# Patient Record
Sex: FEMALE | Race: WHITE | NOT HISPANIC OR LATINO
[De-identification: names, ages, dates, MRNs, and addresses within clinical notes are randomized per-mention and may not be internally consistent; named-entity substitution may affect disease eponyms.]

---

## 2021-04-23 ENCOUNTER — APPOINTMENT (OUTPATIENT)
Dept: OTOLARYNGOLOGY | Facility: CLINIC | Age: 51
End: 2021-04-23
Payer: MEDICAID

## 2021-04-23 VITALS — BODY MASS INDEX: 20.4 KG/M2 | WEIGHT: 130 LBS | HEIGHT: 67 IN | TEMPERATURE: 98.3 F

## 2021-04-23 DIAGNOSIS — Z86.59 PERSONAL HISTORY OF OTHER MENTAL AND BEHAVIORAL DISORDERS: ICD-10-CM

## 2021-04-23 DIAGNOSIS — Z86.79 PERSONAL HISTORY OF OTHER DISEASES OF THE CIRCULATORY SYSTEM: ICD-10-CM

## 2021-04-23 DIAGNOSIS — Z82.49 FAMILY HISTORY OF ISCHEMIC HEART DISEASE AND OTHER DISEASES OF THE CIRCULATORY SYSTEM: ICD-10-CM

## 2021-04-23 DIAGNOSIS — Z78.9 OTHER SPECIFIED HEALTH STATUS: ICD-10-CM

## 2021-04-23 DIAGNOSIS — R42 DIZZINESS AND GIDDINESS: ICD-10-CM

## 2021-04-23 DIAGNOSIS — Z86.69 PERSONAL HISTORY OF OTHER DISEASES OF THE NERVOUS SYSTEM AND SENSE ORGANS: ICD-10-CM

## 2021-04-23 DIAGNOSIS — Z86.39 PERSONAL HISTORY OF OTHER ENDOCRINE, NUTRITIONAL AND METABOLIC DISEASE: ICD-10-CM

## 2021-04-23 PROCEDURE — G0268 REMOVAL OF IMPACTED WAX MD: CPT

## 2021-04-23 PROCEDURE — 92567 TYMPANOMETRY: CPT

## 2021-04-23 PROCEDURE — 99072 ADDL SUPL MATRL&STAF TM PHE: CPT

## 2021-04-23 PROCEDURE — 99204 OFFICE O/P NEW MOD 45 MIN: CPT | Mod: 25

## 2021-04-23 PROCEDURE — 92556 SPEECH AUDIOMETRY COMPLETE: CPT

## 2021-04-23 PROCEDURE — 92552 PURE TONE AUDIOMETRY AIR: CPT

## 2021-04-23 RX ORDER — MECLIZINE HYDROCHLORIDE 12.5 MG/1
12.5 TABLET ORAL
Refills: 0 | Status: ACTIVE | COMMUNITY

## 2021-04-23 RX ORDER — LIOTHYRONINE SODIUM 50 UG/1
TABLET ORAL
Refills: 0 | Status: ACTIVE | COMMUNITY

## 2021-04-23 RX ORDER — LEVOTHYROXINE SODIUM 137 UG/1
TABLET ORAL
Refills: 0 | Status: ACTIVE | COMMUNITY

## 2021-04-23 RX ORDER — CLONAZEPAM 2 MG/1
TABLET ORAL
Refills: 0 | Status: ACTIVE | COMMUNITY

## 2021-04-23 RX ORDER — ONDANSETRON 4 MG/1
4 TABLET ORAL
Refills: 0 | Status: ACTIVE | COMMUNITY

## 2021-04-23 NOTE — HISTORY OF PRESENT ILLNESS
[de-identified] : Initial visit. Her chief complaint is "extreme dizziness, nausea, headache".\par \par She reports that approximately 7-10 days ago she developed spontaneous vertigo.\par She was evaluated in urgent care no specific diagnosis was made. She was given Antivert which is not helped her.\par She then was evaluated by her primary care doctor who did not appreciate any specific pathology.\par \par She reports a history of losing the hearing in her right ear in her 20s. She reports an extensive workup and eventually the etiology was thought to be viral.\par \par Her vertigo seems to be positional.\par She does not note any change in her hearing.

## 2021-04-23 NOTE — ASSESSMENT
[FreeTextEntry1] : My differential diagnosis includes but is not limited to a vestibular neuronitis or a benign positional paroxysmal vertigo.\par She will be treated with a Medrol dose pack.\par She has normal hearing on testing in her left ear so I am not suspicious for a labyrinthitis.\par She is near complete hearing loss in her right ear but I'm not been recommended further workup as this was worked up in the past.\par \par If she is not improving over the weekend she will require vestibular rehabilitation potential he repositioning maneuvers. I will facilitate this for her.

## 2021-07-27 RX ORDER — METHYLPREDNISOLONE 4 MG/1
4 TABLET ORAL
Qty: 1 | Refills: 0 | Status: ACTIVE | COMMUNITY
Start: 2021-04-23 | End: 1900-01-01

## 2021-08-03 ENCOUNTER — APPOINTMENT (OUTPATIENT)
Dept: OTOLARYNGOLOGY | Facility: CLINIC | Age: 51
End: 2021-08-03
Payer: MEDICAID

## 2021-08-03 VITALS — HEIGHT: 67 IN | TEMPERATURE: 97.9 F | BODY MASS INDEX: 20.4 KG/M2 | WEIGHT: 130 LBS

## 2021-08-03 DIAGNOSIS — R42 DIZZINESS AND GIDDINESS: ICD-10-CM

## 2021-08-03 DIAGNOSIS — H90.5 UNSPECIFIED SENSORINEURAL HEARING LOSS: ICD-10-CM

## 2021-08-03 DIAGNOSIS — J30.0 VASOMOTOR RHINITIS: ICD-10-CM

## 2021-08-03 DIAGNOSIS — R51.9 HEADACHE, UNSPECIFIED: ICD-10-CM

## 2021-08-03 PROCEDURE — 99214 OFFICE O/P EST MOD 30 MIN: CPT | Mod: 25

## 2021-08-03 PROCEDURE — 31231 NASAL ENDOSCOPY DX: CPT

## 2021-08-03 NOTE — HISTORY OF PRESENT ILLNESS
[de-identified] : CHAYITO MUNSON has a history of hearing loss since she was 23 years old from sudden loss with vertigo; The vertigo resolved after approx 1 month.  Heraing loss did not resolve.\par \par Patient reports of vertigo and feels off balance after her 2nd COVID Vaccine in April 2021.   Treated with oral steroids which helped.\par \par 1 week ago, traveled to [place], with recurrent vertigo and fatigue, headache. Nasal congestion.  Gradual onset of dizziness.  Nausea. Drunk feeling with spinning. constant without provocation. Exhaustion.  Better with rest. Non episodes. Pre syncope.  No change in hearing or tinnitus.  \par Started with oral steroids x 2 by PCP with no perceived benefit. \par Reports a lot of recent stress\par \par PMH: Tardive Dyskinesia from anti depressant\par \par PMH: breast CA\par \par

## 2021-08-03 NOTE — PHYSICAL EXAM
[FreeTextEntry1] : Procedure: Microscopic Ear Exam\par \par Left ear:  Ear canal intact without inflammation or lesion.  \par Tympanic membrane intact without inflammation.\par \par Right ear:  Ear canal intact without inflammation or lesion.  \par Tympanic membrane intact without inflammation.\par \par  [Nasal Endoscopy Performed] : nasal endoscopy was performed, see procedure section for findings [Midline] : trachea located in midline position [Normal] : no rashes

## 2021-08-03 NOTE — CONSULT LETTER
[Please see my note below.] : Please see my note below. [FreeTextEntry2] : Dear JOSH RYAN  [FreeTextEntry1] : Thank you for allowing me to participate in the care of CHAYITO MUNSON .\par Please see the attached visit note.\par \par \par \par Palomo Wolfe\par Otology\par Medical Director of [facility]\par Department of Otolaryngology\par

## 2021-08-03 NOTE — ASSESSMENT
[FreeTextEntry1] : impression:\par \par Nonspecific dizziness, with no specific evidence of an otologic source. This remains a possibility. Symptoms are associated with a constellation of other symptoms. Given her history of tardive dyskinesia and headaches, I have recommended that she consult her neurologist. e discussed this in detail.\par \par The patient presents with multiple nonspecific symptoms. She is quite clearly emotionally upset and breakdown crying when discussing her current symptoms and the limitations. It gets to her life. I have discussed this with her in detail. She states that she is under a lot of stress and has a history of panic disorder. She's currently under the care of a mental health professional.\par \par \par Time based billing:  I have spent 30 minutes of time, excluding procedures on this encounter.

## 2021-08-24 ENCOUNTER — APPOINTMENT (OUTPATIENT)
Dept: BREAST CENTER | Facility: CLINIC | Age: 51
End: 2021-08-24
Payer: MEDICAID

## 2021-08-24 VITALS — WEIGHT: 30 LBS | HEART RATE: 80 BPM | HEIGHT: 67 IN | BODY MASS INDEX: 4.71 KG/M2 | OXYGEN SATURATION: 99 %

## 2021-08-24 PROCEDURE — 99204 OFFICE O/P NEW MOD 45 MIN: CPT

## 2021-08-25 NOTE — HISTORY OF PRESENT ILLNESS
[FreeTextEntry1] : Patient is a 51 y.o female presenting initially to establish care- she is s/p LEFT lumpectomy ([place] pres, Dr. Lowry) and SLNS (2009- age 39) for invasive ductal carcinoma ER 60% positive, IA 40% strong, Ki-67< 10%,  0/1 node negative for CA, s/p XR and 7 years tamoxifen. Her most recent mammo/US was 7 month ago (according to the pt was negative) She had BRCA testing in 2010 and was negative. She has no acute concerns. \par \par hx of IVF

## 2021-08-25 NOTE — PHYSICAL EXAM
[Supple] : supple [No Supraclavicular Adenopathy] : no supraclavicular adenopathy [No Cervical Adenopathy] : no cervical adenopathy [No dominant masses] : no dominant masses in right breast  [No dominant masses] : no dominant masses left breast [No Nipple Retraction] : no left nipple retraction [No Nipple Discharge] : no left nipple discharge [No Axillary Lymphadenopathy] : no left axillary lymphadenopathy [de-identified] : UOQ incision well healed- 3mm nodule medially- post surgical thickening as patient says it has been present for years.

## 2021-08-25 NOTE — ASSESSMENT
[FreeTextEntry1] : Patient is a 51 y.o female presenting initially to establish care- she is s/p LEFT lumpectomy and SLNS (2009- age 39) for invasive ductal carcinoma ER 60% positive, DC 40% strong, Ki-67< 10%, 0/1 node negative for CA, s/p XR and 7 years tamoxifen.SABRINA.

## 2021-08-25 NOTE — PAST MEDICAL HISTORY
[Menarche Age ____] : age at menarche was [unfilled] [Menopause Age____] : age at menopause was [unfilled] [Total Preg ___] : G[unfilled] [FreeTextEntry5] : NONE [FreeTextEntry6] : YES (IVF) [FreeTextEntry7] : NO [FreeTextEntry8] : N/A

## 2022-05-01 ENCOUNTER — EMERGENCY (EMERGENCY)
Facility: HOSPITAL | Age: 52
LOS: 1 days | Discharge: HOME | End: 2022-05-01
Attending: EMERGENCY MEDICINE | Admitting: EMERGENCY MEDICINE
Payer: MEDICAID

## 2022-05-01 VITALS
SYSTOLIC BLOOD PRESSURE: 121 MMHG | DIASTOLIC BLOOD PRESSURE: 83 MMHG | TEMPERATURE: 97 F | HEIGHT: 67 IN | HEART RATE: 77 BPM | WEIGHT: 130.07 LBS | RESPIRATION RATE: 18 BRPM | OXYGEN SATURATION: 99 %

## 2022-05-01 DIAGNOSIS — F41.9 ANXIETY DISORDER, UNSPECIFIED: ICD-10-CM
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
## 2022-05-01 DIAGNOSIS — Z88.0 ALLERGY STATUS TO PENICILLIN: ICD-10-CM

## 2022-05-01 DIAGNOSIS — F32.A DEPRESSION, UNSPECIFIED: ICD-10-CM

## 2022-05-01 DIAGNOSIS — M79.671 PAIN IN RIGHT FOOT: ICD-10-CM

## 2022-05-01 DIAGNOSIS — S92.354A NONDISPLACED FRACTURE OF FIFTH METATARSAL BONE, RIGHT FOOT, INITIAL ENCOUNTER FOR CLOSED FRACTURE: ICD-10-CM
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
## 2022-05-01 DIAGNOSIS — Z85.3 PERSONAL HISTORY OF MALIGNANT NEOPLASM OF BREAST: ICD-10-CM

## 2022-05-01 PROCEDURE — 73630 X-RAY EXAM OF FOOT: CPT

## 2022-05-01 PROCEDURE — 73630 X-RAY EXAM OF FOOT: CPT | Mod: 26,RT

## 2022-05-01 PROCEDURE — 99284 EMERGENCY DEPT VISIT MOD MDM: CPT | Mod: 25

## 2022-05-01 PROCEDURE — 73630 X-RAY EXAM OF FOOT: CPT | Mod: 26

## 2022-05-01 RX ORDER — OXYCODONE AND ACETAMINOPHEN 5; 325 MG/1; MG/1
1 TABLET ORAL ONCE
Refills: 0 | Status: DISCONTINUED | OUTPATIENT
Start: 2022-05-01 | End: 2022-05-01

## 2022-05-01 RX ADMIN — OXYCODONE AND ACETAMINOPHEN 1 TABLET(S): 5; 325 TABLET ORAL at 10:55

## 2022-05-01 NOTE — ED ADULT NURSE NOTE - OBJECTIVE STATEMENT
52y female presents to ED c/o right foot pain. Pt states she was wearing platform shoes yesterday and fell, twisting the right foot. Endorses pain to right lateral side of foot. Some redness noted. Pt ambulatory and partially weight bearing. No signs of deformity. Denies blood thinner use. A&Ox4.

## 2022-05-01 NOTE — ED PROVIDER NOTE - CLINICAL SUMMARY MEDICAL DECISION MAKING FREE TEXT BOX
Impression: s/p inversion injury of r foot w/ acute non-displaced comminuted fx of base of 5th metatarsal. NVI. Impression: s/p inversion injury of r foot w/ acute non-displaced comminuted fx of base of 5th metatarsal. NVI. Pt seen by podiatry and foot placed in ace wrap and cam boot. ED evaluation and management discussed with the patient in detail. RICE and close podiatry follow up encouraged.  Strict ED return instructions discussed in detail and patient given the opportunity to ask any questions about their discharge diagnosis and instructions. Patient verbalized understanding.

## 2022-05-01 NOTE — ED PROVIDER NOTE - PHYSICAL EXAMINATION
VITAL SIGNS: I have reviewed nursing notes and confirm.  CONSTITUTIONAL: Well appearing, in no acute distress.   SKIN:  warm and dry, no acute rash.   HEAD:  normocephalic, atraumatic.  EYES: EOM intact; conjunctiva and sclera clear.  ENT: No nasal discharge; airway clear.   NECK: Supple.  R foot: + ttp and swelling to base of 5th metatarsal; no tenderness elsewhere including knee, tib-fib, ankle jt, heel, toes. FROM though with pain. No clubbing, cyanosis or edema. No calf tenderness. Compartments soft. 2+ pulses to b/l ue/le.  NEURO: Alert, oriented, motor/ sensation intact.  PSYCH: Cooperative, mood and affect appropriate.

## 2022-05-01 NOTE — CONSULT NOTE ADULT - ASSESSMENT
A:         P:         Patient should follow up with Dr. Diego Reyes within 1 week of discharge.    Office information:          [place] Address- [address], [place] [address] Phone: [phone]         [place] Address- [address], [place] [address] Phone: [phone]   A: 52y old  Female who presents with a chief complaint of right foot pain S/P inversion injury yesterday. Podiatry consulted for comminuted, non-displaced, 5th metatarsal base fracture.     P:   -Pt evaluated and chart reviewed  -X-rays reviewed with pt-comminuted, non-displaced, 5th metatarsal base fracture  -Soares compression applied to pts right foot   -Pt instructed to Rest, Ice, Elevate right foot as much as possible  -Pt placed in CAM boot and given crutches for WBAT to RLE   -Pt can follow up outpt with Dr. Palafox     Patient should follow up with Dr. Diego Reyes within 1 week of discharge.    Office information:          [place] Address- [address] [address] Phone: [phone]         [place] Address- [address], [place] [address] Phone: [phone]

## 2022-05-01 NOTE — ED PROVIDER NOTE - PATIENT PORTAL LINK FT
You can access the FollowMyHealth Patient Portal offered by [facility] by registering at the following website: [email or web address]. By joining [facility]’s FollowMyHealth portal, you will also be able to view your health information using other applications (apps) compatible with our system.

## 2022-05-01 NOTE — ED PROVIDER NOTE - CARE PLAN
1 Principal Discharge DX:	Closed fracture of base of metatarsal bone, unspecified laterality, initial encounter

## 2022-05-01 NOTE — CONSULT NOTE ADULT - SUBJECTIVE AND OBJECTIVE BOX
***************INCOMPLETE******************      Attending: Dr. Reyes     Patient is a 52y old  Female who presents with a chief complaint of right foot pain S/P inversion injury     HPI:      Review of systems negative except per HPI and as stated below  General:	 no weakness; no fevers, no chills  Skin/Breast: no rash  Respiratory and Thorax: no SOB, no cough  Cardiovascular:	No chest pain  Gastrointestinal:	 no nausea, vomiting , diarrhea  Genitourinary:	no dysuria, no difficulty urinating, no hematuria  Musculoskeletal:	no weakness, no joint swelling/pain  Neurological:	no focal weakness/numbness  Endocrine:	no polyuria, no polydipsia    PAST MEDICAL & SURGICAL HISTORY:    Home Medications:    Allergies    penicillins (Hives)    Intolerances      FAMILY HISTORY:    Social History:       LABS              Vital Signs Last 24 Hrs  T(C): 36.3 (01 May 2022 10:19), Max: 36.3 (01 May 2022 10:19)  T(F): 97.4 (01 May 2022 10:19), Max: 97.4 (01 May 2022 10:19)  HR: 77 (01 May 2022 10:19) (77 - 77)  BP: 121/83 (01 May 2022 10:19) (121/83 - 121/83)  BP(mean): --  RR: 18 (01 May 2022 10:19) (18 - 18)  SpO2: 99% (01 May 2022 10:19) (99% - 99%)    PHYSICAL EXAM  General: NAD, AA0x3  Lower Extremity Focused:  Vasc:  Derm:  Neuro:  MSK:       RADIOLOGY: WET READ: comminuted, non-displaced 5th metatarsal base fracture                          Attending: Dr. Reyes     Patient is a 52y old  Female who presents with a chief complaint of right foot pain S/P inversion injury yesterday.    HPI:  53yo female PMHX breast ca, depression/ anxiety who presents with Right foot pain and swelling s/p inversion injury of foot yesterday while wearing platform heels. Pain stared immediately after injury. Pt has been ambulating w/ assistance since then and taking tylenol/advil for pain with mild improvement. She denies any other injuries during her fall.     Review of systems negative except per HPI and as stated below  General:	 no weakness; no fevers, no chills  Skin/Breast: no rash  Respiratory and Thorax: no SOB, no cough  Cardiovascular:	No chest pain  Gastrointestinal:	 no nausea, vomiting , diarrhea  Genitourinary:	no dysuria, no difficulty urinating, no hematuria  Musculoskeletal:	no weakness, no joint swelling/pain  Neurological:	no focal weakness/numbness  Endocrine:	no polyuria, no polydipsia    PAST MEDICAL & SURGICAL HISTORY:    Home Medications:    Allergies    penicillins (Hives)    Intolerances      FAMILY HISTORY:    Social History:     LABS      Vital Signs Last 24 Hrs  T(C): 36.3 (01 May 2022 10:19), Max: 36.3 (01 May 2022 10:19)  T(F): 97.4 (01 May 2022 10:19), Max: 97.4 (01 May 2022 10:19)  HR: 77 (01 May 2022 10:19) (77 - 77)  BP: 121/83 (01 May 2022 10:19) (121/83 - 121/83)  BP(mean): --  RR: 18 (01 May 2022 10:19) (18 - 18)  SpO2: 99% (01 May 2022 10:19) (99% - 99%)    PHYSICAL EXAM  General: NAD, AA0x3  Lower Extremity Focused: Right foot   Vasc: DP/PT 2/4, slight edema to lateral right midfoot near base of 5th met, TG warm to cool   Derm: no open lesions no lacerations or abrasions, erythema to lateral right midfoot    Neuro: Protective sensation intact   MSK: 5/5 muscle strength in all compartments, TTP lateral right midfoot near 5th met base      RADIOLOGY: WET READ: comminuted, non-displaced 5th metatarsal base fracture

## 2022-05-01 NOTE — ED PROVIDER NOTE - NSFOLLOWUPINSTRUCTIONS_ED_ALL_ED_FT
Wear cam walker boot, use crutches and weight bear as tolerated.   Keep foot elevated, apply ice.  Take percocet as needed for pain.   Follow up with a podiatrist for re-evaluation within 1 week.  Return to er for any new or worsening symptoms (increased pain, swelling, numbness, tingling, weakness...).      [facility]                                                                                                                                                                                                                                                                                                                                                                                                                                                                                                    Metatarsal Fracture       A metatarsal fracture is a break in one of the five bones that connect the toes to the rest of the foot. This may also be called a forefoot fracture. A metatarsal fracture may be:  •A crack in the surface of the bone (stress fracture). This often occurs in athletes.      •A break all the way through the bone (complete fracture).      The bone that connects to the little toe (fifth metatarsal) is most commonly fractured. Ballet dancers often fracture this bone.      What are the causes?    A metatarsal fracture may be caused by:  •Sudden twisting of the foot.      •Falling onto the foot.      •Something heavy falling onto the foot.      •Overuse or repetitive exercise.        What increases the risk?    This condition is more likely to develop in people who:  •Play contact sports.      •Do ballet.      •Have a condition that causes the bones to become thin and brittle (osteoporosis).      •Have a low calcium level.        What are the signs or symptoms?    Symptoms of this condition include:  •Pain that gets worse when walking or standing.      •Pain when pressing on the foot or moving the toes.      •Swelling.      •Bruising on the top or bottom of the foot.        How is this diagnosed?    This condition may be diagnosed based on:  •Your symptoms.       •Any recent foot injuries you have had.       •A physical exam.    •An X-ray of your foot. If you have a stress fracture, it may not show up on an X-ray, and you may need other imaging tests, such as:   •A bone scan.      • CT scan.       •MRI.          How is this treated?    Treatment depends on how severe your fracture is and how the pieces of the broken bone line up with each other (alignment). Treatment may involve:  •Wearing a cast, splint, or supportive boot on your foot.      •Using crutches, and not putting any weight on your foot.      •Having surgery to align broken bones (open reduction and internal fixation, ORIF).      •Physical therapy.      •Follow-up visits and X-rays to make sure you are healing.        Follow these instructions at home:    If you have a splint or a supportive boot:     •Wear the splint or boot as told by your health care provider. Remove it only as told by your health care provider.      • Loosen the splint or boot if your toes tingle, become numb, or turn cold and blue.       •Keep the splint or boot clean.     •If your splint or boot is not waterproof:   •Do not let it get wet.       •Cover it with a watertight covering when you take a bath or a shower.        If you have a cast:     • Do not stick anything inside the cast to scratch your skin. Doing that increases your risk for infection.      •Check the skin around the cast every day. Tell your health care provider about any concerns.      •You may put lotion on dry skin around the edges of the cast. Do not put lotion on the skin underneath the cast.      •Keep the cast clean.    •If the cast is not waterproof:   •Do not let it get wet.      •Cover it with a watertight covering when you take a bath or a shower.        Activity     • Do not use your affected leg to support your body weight until your health care provider says that you can. Use crutches as directed.      •Ask your health care provider what activities are safe for you during recovery, and ask what activities you need to avoid.      •Do physical therapy exercises as directed.      Driving     • Do not drive or use heavy machinery while taking pain medicine.      • Do not drive while wearing a cast, splint, or boot on a foot that you use for driving.        Managing pain, stiffness, and swelling    •If directed, put ice on painful areas:  •Put ice in a plastic bag.    •Place a towel between your skin and the bag.  •If you have a removable splint or boot, remove it as told by your health care provider.      •If you have a cast, place a towel between your cast and the bag.        •Leave the ice on for 20 minutes, 2–3 times a day.        •Move your toes often to avoid stiffness and to lessen swelling.      •Raise (elevate) your lower leg above the level of your heart while you are sitting or lying down.      General instructions     • Do not put pressure on any part of the cast or splint until it is fully hardened. This may take several hours.      •Take over-the-counter and prescription medicines only as told by your health care provider.      • Do not use any products that contain nicotine or tobacco, such as cigarettes and e-cigarettes. These can delay bone healing. If you need help quitting, ask your health care provider.      •  Do not take baths, swim, or use a hot tub until your health care provider approves. Ask your health care provider if you may take showers.      •Keep all follow-up visits as told by your health care provider. This is important.        Contact a health care provider if you have:    •Pain that gets worse or does not get better with medicine.       •A fever.      •A bad smell coming from your cast or splint.        Get help right away if you have:  •Any of the following in your toes or your foot, even after loosening your splint (if applicable):   •Numbness.       •Tingling.       •Coldness.      • Blue skin.         •Redness or swelling that gets worse.      •Pain that suddenly becomes severe.        Summary    •A metatarsal fracture is a break in one of the five bones that connect the toes to the rest of the foot.      •Treatment depends on how severe your fracture is and how the pieces of the broken bone line up with each other (alignment). This may include wearing a cast, splint, or supportive boot, or using crutches. Sometimes surgery is needed to align the bones.      •Ice and elevate your foot to help lessen the pain and swelling.      •Make sure you know what symptoms should cause you to get help right away.      This information is not intended to replace advice given to you by your health care provider. Make sure you discuss any questions you have with your health care provider.      Document Revised: 04/09/2020 Document Reviewed: 01/14/2019    [employer] Patient Education © 2022 [employer] Wear cam walker boot, use crutches and weight bear as tolerated.   Keep foot elevated, apply ice.  Take percocet as needed for pain.   Follow up with a podiatrist for re-evaluation within 1 week.  Return to er for any new or worsening symptoms (increased pain, swelling, numbness, tingling, weakness...).        [facility]                                                                               Crutch Use, Adult      Crutches are used to take weight off of one of your legs or feet when you stand or walk. You may need crutches to help you heal after an injury or procedure. It is important to use crutches that fit properly. When fitted properly:  •Each crutch should be 2–3 finger widths below the armpit.      •Your weight should be supported by your hand and not by resting your armpit on the crutch.      It is important that a health care provider has seen you use crutches effectively before you use them at home.      What are the risks?    Improper use of crutches can injure your shoulders, arms, back, armpits, wrists, and hands. To prevent this from happening, make sure your crutches fit properly, and do not put pressure on your armpits when using the crutches.    While using crutches, you also have a higher risk of falling. To prevent falls while using crutches at home or work:  •Move furniture or barriers that are in your walkway when possible. Have someone help you with this as needed.      •Remove rugs, cords, and other items that you can trip on. Have someone help you with this as needed.      •Keep walkways well lit.      •Use a backpack so you do not need to carry items in your hands.        How to use your crutches    How you use your crutches will depend on the reason you need them. Your health care provider may tell you not to put any weight on the affected leg (non–weight-bearing). Or your health care provider may allow you to put some, but not all, weight on the affected leg (partial weight-bearing). Follow instructions from your health care provider about weight-bearing. Do not bear weight in an amount that causes pain to the affected area.      Walking      1.Stand on your healthy leg and lift both crutches at the same time.      2.Place the crutches one step-length in front of you. Keep your weight over the hand .      3.Bring your healthy leg forward to meet, or land slightly ahead of, the crutches.      4.Repeat.        Going up steps      If there is no handrail:  1.Walk up to steps and put weight on hand  to step up.      2.Step up with the healthy leg.      3.Step up with the crutches and injured leg.      4.Repeat.      If there is a handrail:  1.Hold both crutches in one hand.      2.Place your other hand on the handrail.      3.Place your weight on your arms and step up with your healthy leg.      4.Bring the crutches and the injured leg up to that step.      5.Continue in this way.      If you feel unsteady on steps, you can go up steps on your buttocks. To go up, sit on the lowest step with your injured leg in front and holding both crutches flat against the stairs in one hand. Then use your free hand and your healthy leg for support to scoot your buttocks up to the next step.      Going down steps      If there is no handrail:  1.Step down with the injured leg and crutches. Make sure to keep the crutch tips in the center of the step, not close to the front edge.      2.Step down with the healthy leg.      3.Repeat.      If there is a handrail:  1.Place one hand on the handrail.      2.Hold both crutches with your free hand.      3.Lower your injured leg and crutches to the step below you. Make sure to keep the crutch tips in the center of the step, not close to the front edge.      4.Lower your healthy leg down to the next step.      5.Repeat.      If you feel unsteady on steps, you can go down steps on your buttocks. To go down, sit on the highest step with your injured leg in front and holding both crutches flat against the stairs in the other hand. Then use your free hand and your healthy leg for support to scoot your buttocks down to the next step.    Standing up     •Move to the edge of the seat.      If there is an armrest:  1.Hold the injured leg forward.      2.Grab the armrest with one hand and the top of the crutches with the other hand.      3.Using the armrest and your crutches, pull yourself up to a standing position.      If there is no armrest:  1.Hold the injured leg forward.      2.Hold on to the seat with one hand and the top of the crutches with the other hand.      3.Using the seat and your crutches, bring yourself up to a standing position.      Sitting down    •Move back until your leg touches the edge of the seat.      If there is an armrest:  1.Hold the injured leg forward.      2.Grab the armrest with one hand and the top of the crutches with the other hand.      3.Slowly lower yourself to a sitting position.      If there is no armrest:  1.Hold the injured leg forward.      2.Reach for and hold on to the seat with one hand and hold on to the top of the crutches with the other hand.      3.Slowly lower yourself to a sitting position.        Contact a health care provider if:    •You feel unsteady using crutches.      •You develop any new pain.      •You develop any numbness or tingling.      •Your crutches do not fit.        Get help right away if:    •You fall.        Summary    •Crutches are used when you need to take weight off of one of your legs or feet to stand or walk.      •To prevent injury, make sure your crutches fit properly, and do not put pressure on your armpits when using the crutches.      •Follow instructions from your health care provider about weight-bearing.      This information is not intended to replace advice given to you by your health care provider. Make sure you discuss any questions you have with your health care provider.      Document Revised: 07/08/2020 Document Reviewed: 07/08/2020    [employer] Patient Education © 2022 [employer]                                                                                                                                                                                                                                                                                                                                                                                                                                                                                                      Metatarsal Fracture       A metatarsal fracture is a break in one of the five bones that connect the toes to the rest of the foot. This may also be called a forefoot fracture. A metatarsal fracture may be:  •A crack in the surface of the bone (stress fracture). This often occurs in athletes.      •A break all the way through the bone (complete fracture).      The bone that connects to the little toe (fifth metatarsal) is most commonly fractured. Ballet dancers often fracture this bone.      What are the causes?    A metatarsal fracture may be caused by:  •Sudden twisting of the foot.      •Falling onto the foot.      •Something heavy falling onto the foot.      •Overuse or repetitive exercise.        What increases the risk?    This condition is more likely to develop in people who:  •Play contact sports.      •Do ballet.      •Have a condition that causes the bones to become thin and brittle (osteoporosis).      •Have a low calcium level.        What are the signs or symptoms?    Symptoms of this condition include:  •Pain that gets worse when walking or standing.      •Pain when pressing on the foot or moving the toes.      •Swelling.      •Bruising on the top or bottom of the foot.        How is this diagnosed?    This condition may be diagnosed based on:  •Your symptoms.       •Any recent foot injuries you have had.       •A physical exam.    •An X-ray of your foot. If you have a stress fracture, it may not show up on an X-ray, and you may need other imaging tests, such as:   •A bone scan.      • CT scan.       •MRI.          How is this treated?    Treatment depends on how severe your fracture is and how the pieces of the broken bone line up with each other (alignment). Treatment may involve:  •Wearing a cast, splint, or supportive boot on your foot.      •Using crutches, and not putting any weight on your foot.      •Having surgery to align broken bones (open reduction and internal fixation, ORIF).      •Physical therapy.      •Follow-up visits and X-rays to make sure you are healing.        Follow these instructions at home:    If you have a splint or a supportive boot:     •Wear the splint or boot as told by your health care provider. Remove it only as told by your health care provider.      • Loosen the splint or boot if your toes tingle, become numb, or turn cold and blue.       •Keep the splint or boot clean.     •If your splint or boot is not waterproof:   •Do not let it get wet.       •Cover it with a watertight covering when you take a bath or a shower.        If you have a cast:     • Do not stick anything inside the cast to scratch your skin. Doing that increases your risk for infection.      •Check the skin around the cast every day. Tell your health care provider about any concerns.      •You may put lotion on dry skin around the edges of the cast. Do not put lotion on the skin underneath the cast.      •Keep the cast clean.    •If the cast is not waterproof:   •Do not let it get wet.      •Cover it with a watertight covering when you take a bath or a shower.        Activity     • Do not use your affected leg to support your body weight until your health care provider says that you can. Use crutches as directed.      •Ask your health care provider what activities are safe for you during recovery, and ask what activities you need to avoid.      •Do physical therapy exercises as directed.      Driving     • Do not drive or use heavy machinery while taking pain medicine.      • Do not drive while wearing a cast, splint, or boot on a foot that you use for driving.        Managing pain, stiffness, and swelling    •If directed, put ice on painful areas:  •Put ice in a plastic bag.    •Place a towel between your skin and the bag.  •If you have a removable splint or boot, remove it as told by your health care provider.      •If you have a cast, place a towel between your cast and the bag.        •Leave the ice on for 20 minutes, 2–3 times a day.        •Move your toes often to avoid stiffness and to lessen swelling.      •Raise (elevate) your lower leg above the level of your heart while you are sitting or lying down.      General instructions     • Do not put pressure on any part of the cast or splint until it is fully hardened. This may take several hours.      •Take over-the-counter and prescription medicines only as told by your health care provider.      • Do not use any products that contain nicotine or tobacco, such as cigarettes and e-cigarettes. These can delay bone healing. If you need help quitting, ask your health care provider.      •  Do not take baths, swim, or use a hot tub until your health care provider approves. Ask your health care provider if you may take showers.      •Keep all follow-up visits as told by your health care provider. This is important.        Contact a health care provider if you have:    •Pain that gets worse or does not get better with medicine.       •A fever.      •A bad smell coming from your cast or splint.        Get help right away if you have:  •Any of the following in your toes or your foot, even after loosening your splint (if applicable):   •Numbness.       •Tingling.       •Coldness.      • Blue skin.         •Redness or swelling that gets worse.      •Pain that suddenly becomes severe.        Summary    •A metatarsal fracture is a break in one of the five bones that connect the toes to the rest of the foot.      •Treatment depends on how severe your fracture is and how the pieces of the broken bone line up with each other (alignment). This may include wearing a cast, splint, or supportive boot, or using crutches. Sometimes surgery is needed to align the bones.      •Ice and elevate your foot to help lessen the pain and swelling.      •Make sure you know what symptoms should cause you to get help right away.      This information is not intended to replace advice given to you by your health care provider. Make sure you discuss any questions you have with your health care provider.      Document Revised: 04/09/2020 Document Reviewed: 01/14/2019    [employer] Patient Education © 2022 [employer]

## 2022-05-01 NOTE — ED PROVIDER NOTE - OBJECTIVE STATEMENT
Pt is a 51yo f, h/o breast ca, depression/ anxiety, who p/w r foot pain and swelling s/p inversion injury of foot yesterday while wearing platform heels. Pt has been ambulating w/ assistance since then and taking tylenol/ advil for pain with mild improvement. No n/t/w. No injury elsewhere.

## 2022-05-01 NOTE — ED PROVIDER NOTE - CARE PROVIDER_API CALL
Diego Reyes (FLORENCIOM)  Podiatric Medicine and Surgery  [address], [address]  [place] [address]  Phone: [phone]  Fax: [phone]  Follow Up Time:

## 2022-06-09 ENCOUNTER — OUTPATIENT (OUTPATIENT)
Dept: OUTPATIENT SERVICES | Facility: HOSPITAL | Age: 52
LOS: 1 days | End: 2022-06-09
Payer: MEDICAID

## 2022-06-09 PROCEDURE — 73630 X-RAY EXAM OF FOOT: CPT

## 2022-06-09 PROCEDURE — 73600 X-RAY EXAM OF ANKLE: CPT

## 2022-06-09 PROCEDURE — 73600 X-RAY EXAM OF ANKLE: CPT | Mod: 26,RT

## 2022-06-09 PROCEDURE — 73630 X-RAY EXAM OF FOOT: CPT | Mod: 26,50

## 2022-07-05 ENCOUNTER — OUTPATIENT (OUTPATIENT)
Dept: OUTPATIENT SERVICES | Facility: HOSPITAL | Age: 52
LOS: 1 days | End: 2022-07-05
Payer: MEDICAID

## 2022-07-05 PROCEDURE — 73630 X-RAY EXAM OF FOOT: CPT

## 2022-07-05 PROCEDURE — 73630 X-RAY EXAM OF FOOT: CPT | Mod: 26,RT

## 2022-08-29 ENCOUNTER — OUTPATIENT (OUTPATIENT)
Dept: OUTPATIENT SERVICES | Facility: HOSPITAL | Age: 52
LOS: 1 days | End: 2022-08-29
Payer: MEDICAID

## 2022-08-29 PROCEDURE — 73630 X-RAY EXAM OF FOOT: CPT | Mod: 26,RT

## 2022-08-29 PROCEDURE — 73630 X-RAY EXAM OF FOOT: CPT

## 2023-01-24 ENCOUNTER — OUTPATIENT (OUTPATIENT)
Dept: OUTPATIENT SERVICES | Facility: HOSPITAL | Age: 53
LOS: 1 days | End: 2023-01-24
Payer: MEDICAID

## 2023-01-24 PROCEDURE — 73630 X-RAY EXAM OF FOOT: CPT

## 2023-01-24 PROCEDURE — 73630 X-RAY EXAM OF FOOT: CPT | Mod: 26,50
